# Patient Record
Sex: FEMALE | Race: WHITE | NOT HISPANIC OR LATINO | URBAN - METROPOLITAN AREA
[De-identification: names, ages, dates, MRNs, and addresses within clinical notes are randomized per-mention and may not be internally consistent; named-entity substitution may affect disease eponyms.]

---

## 2022-03-02 ENCOUNTER — TELEPHONE (OUTPATIENT)
Dept: GASTROENTEROLOGY | Facility: CLINIC | Age: 77
End: 2022-03-02
Payer: COMMERCIAL

## 2022-03-02 NOTE — TELEPHONE ENCOUNTER
----- Message from Raquel Starr sent at 3/2/2022  9:38 AM CST -----  pt needs to be seen for colon cancer, unable to schedule....[phone] (home)

## 2022-03-02 NOTE — TELEPHONE ENCOUNTER
----- Message from Lynda Esposito sent at 3/2/2022  8:34 AM CST -----  Lou David would like for Bev to give her a call back as soon as possible. She said she was just informed that she has cancer and needs to speak with someone. Please give her a call back at [phone] (home)     She did mention hat she's having an issue with spam calls and would prefer an email.